# Patient Record
Sex: FEMALE | Race: WHITE | NOT HISPANIC OR LATINO | Employment: FULL TIME | URBAN - METROPOLITAN AREA
[De-identification: names, ages, dates, MRNs, and addresses within clinical notes are randomized per-mention and may not be internally consistent; named-entity substitution may affect disease eponyms.]

---

## 2019-01-22 PROBLEM — F41.9 ANXIETY AND DEPRESSION: Status: ACTIVE | Noted: 2019-01-22

## 2019-01-22 PROBLEM — F32.A ANXIETY AND DEPRESSION: Status: ACTIVE | Noted: 2019-01-22

## 2019-08-26 PROBLEM — Z79.899 ENCOUNTER FOR LONG-TERM (CURRENT) USE OF MEDICATIONS: Status: ACTIVE | Noted: 2019-08-26

## 2019-08-26 PROBLEM — Z00.00 WELL ADULT EXAM: Status: ACTIVE | Noted: 2019-08-26

## 2019-11-25 PROBLEM — Z00.00 WELL ADULT EXAM: Status: RESOLVED | Noted: 2019-08-26 | Resolved: 2019-11-25

## 2020-09-25 PROBLEM — Z00.00 WELL ADULT EXAM: Status: ACTIVE | Noted: 2020-09-25

## 2020-12-28 PROBLEM — Z00.00 WELL ADULT EXAM: Status: RESOLVED | Noted: 2020-09-25 | Resolved: 2020-12-28

## 2021-05-10 ENCOUNTER — PATIENT MESSAGE (OUTPATIENT)
Dept: RESEARCH | Facility: HOSPITAL | Age: 41
End: 2021-05-10

## 2023-02-02 PROBLEM — Z00.00 ANNUAL PHYSICAL EXAM: Status: ACTIVE | Noted: 2023-02-02

## 2023-02-02 PROBLEM — Z86.39 HISTORY OF VITAMIN D DEFICIENCY: Status: ACTIVE | Noted: 2023-02-02

## 2023-05-08 PROBLEM — Z00.00 ANNUAL PHYSICAL EXAM: Status: RESOLVED | Noted: 2023-02-02 | Resolved: 2023-05-08

## 2023-09-13 ENCOUNTER — TELEPHONE (OUTPATIENT)
Dept: OBSTETRICS AND GYNECOLOGY | Facility: CLINIC | Age: 43
End: 2023-09-13
Payer: COMMERCIAL

## 2023-09-13 NOTE — TELEPHONE ENCOUNTER
----- Message from Sylvia Somers sent at 9/13/2023  4:41 PM CDT -----  Type:  Appointment Request    Caller is requesting an appointment.      Name of Caller:  Pt    Symptoms:  pt is 12wks pregnant and is high risk because of her age and was recommended by another dr.    Would the patient rather a call back or a response via [facility]?  Call back    Best Call Back Number:  [phone]    Additional Information:  Pt has an ob and and she is new and feel like they are not compatible and would like to know if Dr Cortez would take her on as a new pt. And we also offer the after hours on call nurse that would help with her being high risk.    Please call back to advise. Thanks!

## 2023-09-13 NOTE — TELEPHONE ENCOUNTER
Returned Call. Spoke with patient who is aware that Dr Cortez is not seeing new Ob's. Patient verbalized understanding.

## 2024-01-12 DIAGNOSIS — O09.529 AMA (ADVANCED MATERNAL AGE) MULTIGRAVIDA 35+: Primary | ICD-10-CM

## 2024-02-01 ENCOUNTER — PROCEDURE VISIT (OUTPATIENT)
Dept: MATERNAL FETAL MEDICINE | Facility: CLINIC | Age: 44
End: 2024-02-01
Attending: FAMILY MEDICINE
Payer: COMMERCIAL

## 2024-02-01 DIAGNOSIS — O09.529 AMA (ADVANCED MATERNAL AGE) MULTIGRAVIDA 35+: ICD-10-CM

## 2024-02-01 PROCEDURE — 76819 FETAL BIOPHYS PROFIL W/O NST: CPT | Mod: S$GLB,,, | Performed by: STUDENT IN AN ORGANIZED HEALTH CARE EDUCATION/TRAINING PROGRAM

## 2024-02-01 PROCEDURE — 76816 OB US FOLLOW-UP PER FETUS: CPT | Mod: S$GLB,,, | Performed by: STUDENT IN AN ORGANIZED HEALTH CARE EDUCATION/TRAINING PROGRAM

## 2024-03-17 PROBLEM — O26.899 VAGINAL DISCHARGE IN PREGNANCY: Status: ACTIVE | Noted: 2024-03-17

## 2024-03-17 PROBLEM — N89.8 VAGINAL DISCHARGE IN PREGNANCY: Status: ACTIVE | Noted: 2024-03-17

## 2024-03-17 PROBLEM — Z3A.39 39 WEEKS GESTATION OF PREGNANCY: Status: ACTIVE | Noted: 2024-03-17

## 2024-03-17 PROBLEM — A60.09 GENITAL HERPES SIMPLEX VIRUS (HSV) INFECTION IN MOTHER AFFECTING PREGNANCY: Status: ACTIVE | Noted: 2024-03-17

## 2024-03-17 PROBLEM — O98.319 GENITAL HERPES SIMPLEX VIRUS (HSV) INFECTION IN MOTHER AFFECTING PREGNANCY: Status: ACTIVE | Noted: 2024-03-17

## 2024-03-17 PROBLEM — O09.513 ELDERLY PRIMIGRAVIDA IN THIRD TRIMESTER: Status: ACTIVE | Noted: 2024-03-17

## 2024-03-17 PROBLEM — O26.893 VAGINAL DISCHARGE IN PREGNANCY IN THIRD TRIMESTER: Status: ACTIVE | Noted: 2024-03-17

## 2024-03-18 PROBLEM — O42.92 FULL-TERM PREMATURE RUPTURE OF MEMBRANES: Status: ACTIVE | Noted: 2024-03-18

## 2024-06-17 PROBLEM — O42.92 FULL-TERM PREMATURE RUPTURE OF MEMBRANES: Status: RESOLVED | Noted: 2024-03-18 | Resolved: 2024-06-17
